# Patient Record
(demographics unavailable — no encounter records)

---

## 2024-11-15 NOTE — REVIEW OF SYSTEMS
[Vertigo] : vertigo [Speech Disturbance] : speech disturbance [Depression] : depression [Negative] : Musculoskeletal [Weakness] : weakness [de-identified] : +imbalance

## 2024-11-15 NOTE — HISTORY OF PRESENT ILLNESS
[FreeTextEntry1] : 81-year-old pleasant woman with history of HTN, hyperlipidemia, hypothyroidism, depression, class I obesity (BMI 33), CAD s/p PCI to pLAD and mLAD with Xience stents (3.5 x 12 and 3.0 x 23) on 7/21/22 is here for f/u.  11/15/24: She has been having intermittent pain along her b/l shoulders associated with some sweating and she feels the need to sit down during these episodes. After resting for a few minutes the symptoms resolve. Does not feel similar to prior when she needed stents but states that the first time it happened was in the hospital and was told her BP had dropped. Has gained weight since the summer. No CP. Unlimited ET. No LE edema.   5/17/24: No chest pain. No shortness of breath. No dyspnea. Unlimited exercise tolerance. No orthopnea. No PND. Has vertigo occasionally.   11/17/23: She states that about 3 weeks ago she had a migraine preceded by aura, garbled speech. States that the garbled speech was new for her and has not happened with previous migraines. Has not seen a neurologist for migraines. Is still taking Plavix and reports bruising but somewhat controlled. Doing well on Celexa and states that she no longer has crying spells. Still reports feeling "off-balance".  9/12/23: Since last visit, states her depression has greatly improved on Celexa. She no longer has crying spells. She would still like to see a therapist. Her bruising has improved. She takes Plavix daily. No CP, SOB, unlimited exercise tolerance. She walks her dog daily but activity is sometimes limited by knee pain. She has had right ankle swelling the past few months but denies trauma. She needs a new PCP as previous one is now .   7/11/23: off balance sometimes. + Depression. + crying spells. Still c/o black and blues. No chest pain. No shortness of breath. No dyspnea. Unlimited exercise tolerance. No orthopnea. No PND.  2/21/23: Since the last visit, pt had 2 mechanical falls, the last of which was on 10/23/22 and she broke her nose ads well as L knee (needing knee surgery). She also had nose bleeds, now resolved. She had a short stay at [facility] after knee surgery. After all that happened, she has been feeling down and does not think that Wellbutrrin is helping her depression. She lost 16 lbs during the past year and from a cardiac perspective feels good. No CP. No SOB. No orthopnea. No PND. Unlimited ET.  8/16/22: She has been feeling better post intervention. She had COVID in the past and never fully regained her sense of smell and taste. No chest pain. No shortness of breath. No dyspnea. Unlimited exercise tolerance. No orthopnea. No PND. Latest labs from July 2022: A1c 5.2, LDL 74 and 81, HDL 49, TG 70 Cr, LFTs wnl

## 2024-11-15 NOTE — HISTORY OF PRESENT ILLNESS
[FreeTextEntry1] : 81-year-old pleasant woman with history of HTN, hyperlipidemia, hypothyroidism, depression, class I obesity (BMI 33), CAD s/p PCI to pLAD and mLAD with Xience stents (3.5 x 12 and 3.0 x 23) on 7/21/22 is here for f/u.  11/15/24: She has been having intermittent pain along her b/l shoulders associated with some sweating and she feels the need to sit down during these episodes. After resting for a few minutes the symptoms resolve. Does not feel similar to prior when she needed stents but states that the first time it happened was in the hospital and was told her BP had dropped. Has gained weight since the summer. No CP. Unlimited ET. No LE edema.   5/17/24: No chest pain. No shortness of breath. No dyspnea. Unlimited exercise tolerance. No orthopnea. No PND. Has vertigo occasionally.   11/17/23: She states that about 3 weeks ago she had a migraine preceded by aura, garbled speech. States that the garbled speech was new for her and has not happened with previous migraines. Has not seen a neurologist for migraines. Is still taking Plavix and reports bruising but somewhat controlled. Doing well on Celexa and states that she no longer has crying spells. Still reports feeling "off-balance".  9/12/23: Since last visit, states her depression has greatly improved on Celexa. She no longer has crying spells. She would still like to see a therapist. Her bruising has improved. She takes Plavix daily. No CP, SOB, unlimited exercise tolerance. She walks her dog daily but activity is sometimes limited by knee pain. She has had right ankle swelling the past few months but denies trauma. She needs a new PCP as previous one is now .   7/11/23: off balance sometimes. + Depression. + crying spells. Still c/o black and blues. No chest pain. No shortness of breath. No dyspnea. Unlimited exercise tolerance. No orthopnea. No PND.  2/21/23: Since the last visit, pt had 2 mechanical falls, the last of which was on 10/23/22 and she broke her nose ads well as L knee (needing knee surgery). She also had nose bleeds, now resolved. She had a short stay at [facility] after knee surgery. After all that happened, she has been feeling down and does not think that Wellbutrrin is helping her depression. She lost 16 lbs during the past year and from a cardiac perspective feels good. No CP. No SOB. No orthopnea. No PND. Unlimited ET.  8/16/22: She has been feeling better post intervention. She had COVID in the past and never fully regained her sense of smell and taste. No chest pain. No shortness of breath. No dyspnea. Unlimited exercise tolerance. No orthopnea. No PND. Latest labs from July 2022: A1c 5.2, LDL 74 and 81, HDL 49, TG 70 Cr, LFTs wnl

## 2024-11-15 NOTE — REVIEW OF SYSTEMS
[Vertigo] : vertigo [Speech Disturbance] : speech disturbance [Depression] : depression [Negative] : Musculoskeletal [Weakness] : weakness [de-identified] : +imbalance

## 2024-11-15 NOTE — DISCUSSION/SUMMARY
[EKG obtained to assist in diagnosis and management of assessed problem(s)] : EKG obtained to assist in diagnosis and management of assessed problem(s) [FreeTextEntry1] : 80 year old pleasant woman with history of HTN, hyperlipidemia, hypothyroidism, depression, class I obesity (BMI 33) CAD s/p PCI to pLAD and mLAD with Xience stents (3.5 x 12 and 3.0 x 23) on 7/21/22 is here for f/u  1. CAD s/p PCI, stable. Asymptomatic. Continue Plavix. Aspirin was stopped a year post DAPT after PCI. Continue Toprol XL 25 mg QD and statin.  2. Hyperlipidemia - LDL 73 in 11/2023 (goal <70). Cont Crestor 40mg daily. Will re-check lipids and CMP today.   3. BP at goal. Continue Toprol XL 25 mg qd as above.  4. Depression - Improved. Continue Celexa 10 mg QD. She will contact her insurance to find a therapist that is covered  5. Class I obesity - Continue lifestyle modification  6. Migraine with dysarthria/ Imbalance/vertigo - f/u with neuro. Will get vestibular therapy.   7. Depression/Anxiety - referred to psych in the past, pt will f/u but has still not found a therapist  8. B/l shoulder pain a/w sweating and lightheadedness - episodes sound to be vagal in nature, counseled on compression socks, hydration, lifestyle mgmt  9. Healthcare maintenance - f/u with PCP

## 2025-05-02 NOTE — HISTORY OF PRESENT ILLNESS
[FreeTextEntry1] : 81-year-old pleasant woman with history of HTN, hyperlipidemia, hypothyroidism, depression, class I obesity (BMI 33), CAD s/p PCI to pLAD and mLAD with Xience stents (3.5 x 12 and 3.0 x 23) on 7/21/22 is here for f/u.  5/2/25: No chest pain. No shortness of breath. No dyspnea. Unlimited exercise tolerance. No orthopnea. No PND. Feels depressed d/t family issues and anxious.   11/15/24: She has been having intermittent pain along her b/l shoulders associated with some sweating and she feels the need to sit down during these episodes. After resting for a few minutes the symptoms resolve. Does not feel similar to prior when she needed stents but states that the first time it happened was in the hospital and was told her BP had dropped. Has gained weight since the summer. No CP. Unlimited ET. No LE edema.   5/17/24: No chest pain. No shortness of breath. No dyspnea. Unlimited exercise tolerance. No orthopnea. No PND. Has vertigo occasionally.   11/17/23: She states that about 3 weeks ago she had a migraine preceded by aura, garbled speech. States that the garbled speech was new for her and has not happened with previous migraines. Has not seen a neurologist for migraines. Is still taking Plavix and reports bruising but somewhat controlled. Doing well on Celexa and states that she no longer has crying spells. Still reports feeling "off-balance".  9/12/23: Since last visit, states her depression has greatly improved on Celexa. She no longer has crying spells. She would still like to see a therapist. Her bruising has improved. She takes Plavix daily. No CP, SOB, unlimited exercise tolerance. She walks her dog daily but activity is sometimes limited by knee pain. She has had right ankle swelling the past few months but denies trauma. She needs a new PCP as previous one is now .   7/11/23: off balance sometimes. + Depression. + crying spells. Still c/o black and blues. No chest pain. No shortness of breath. No dyspnea. Unlimited exercise tolerance. No orthopnea. No PND.  2/21/23: Since the last visit, pt had 2 mechanical falls, the last of which was on 10/23/22 and she broke her nose ads well as L knee (needing knee surgery). She also had nose bleeds, now resolved. She had a short stay at [facility] after knee surgery. After all that happened, she has been feeling down and does not think that Wellbutrrin is helping her depression. She lost 16 lbs during the past year and from a cardiac perspective feels good. No CP. No SOB. No orthopnea. No PND. Unlimited ET.  8/16/22: She has been feeling better post intervention. She had COVID in the past and never fully regained her sense of smell and taste. No chest pain. No shortness of breath. No dyspnea. Unlimited exercise tolerance. No orthopnea. No PND. Latest labs from July 2022: A1c 5.2, LDL 74 and 81, HDL 49, TG 70 Cr, LFTs wnl

## 2025-05-02 NOTE — CARDIOLOGY SUMMARY
[de-identified] : 5/2/25: NSR, RBBB - no change from prior 11/15/24: NSR, RBBB and LAFB, stable from prior 5/17/24: NSR at 64 bpm, RBBB, LAD 7/11/23: NSR, IRBBB

## 2025-05-02 NOTE — REVIEW OF SYSTEMS
[Vertigo] : vertigo [Weakness] : weakness [Speech Disturbance] : speech disturbance [Depression] : depression [Negative] : Musculoskeletal [de-identified] : +imbalance

## 2025-05-02 NOTE — DISCUSSION/SUMMARY
[EKG obtained to assist in diagnosis and management of assessed problem(s)] : EKG obtained to assist in diagnosis and management of assessed problem(s) [FreeTextEntry1] : 81 year old pleasant woman with history of HTN, hyperlipidemia, hypothyroidism, depression, class I obesity (BMI 33) CAD s/p PCI to pLAD and mLAD with Xience stents (3.5 x 12 and 3.0 x 23) on 7/21/22 is here for f/u  1. CAD s/p PCI, stable. Asymptomatic. Continue Plavix. Aspirin was stopped a year post DAPT after PCI. Continue Toprol XL 25 mg QD and statin.  2. Hyperlipidemia - LDL 73 in 11/2023 (goal <70). Cont Crestor 40mg daily. Will re-check lipids and CMP today.   3. BP at goal. Continue Toprol XL 25 mg qd as above.  4. Depression - Improved. Continue Celexa 10 mg QD. She will contact her insurance to find a therapist that is covered  5. Class I obesity - Continue lifestyle modification. Referral to nutritionist  6. Migraine with dysarthria/ Imbalance/vertigo - f/u with neuro. Will get vestibular therapy.   7. Depression/Anxiety - referred to psych in the past, pt will f/u but has still not found a therapist  8. Healthcare maintenance - needs a new PCP. Gave a list of PCPs